# Patient Record
Sex: MALE | Race: WHITE | NOT HISPANIC OR LATINO | URBAN - METROPOLITAN AREA
[De-identification: names, ages, dates, MRNs, and addresses within clinical notes are randomized per-mention and may not be internally consistent; named-entity substitution may affect disease eponyms.]

---

## 2018-12-29 ENCOUNTER — OFFICE VISIT (OUTPATIENT)
Dept: URGENT CARE | Facility: CLINIC | Age: 28
End: 2018-12-29
Payer: COMMERCIAL

## 2018-12-29 VITALS
HEIGHT: 67 IN | DIASTOLIC BLOOD PRESSURE: 90 MMHG | WEIGHT: 206 LBS | BODY MASS INDEX: 32.33 KG/M2 | RESPIRATION RATE: 16 BRPM | HEART RATE: 60 BPM | OXYGEN SATURATION: 98 % | TEMPERATURE: 98.1 F | SYSTOLIC BLOOD PRESSURE: 140 MMHG

## 2018-12-29 DIAGNOSIS — S01.80XA AVULSION OF SKIN OF FACE, INITIAL ENCOUNTER: ICD-10-CM

## 2018-12-29 DIAGNOSIS — S01.81XA LACERATION OF FACE WITH COMPLICATION, INITIAL ENCOUNTER: Primary | ICD-10-CM

## 2018-12-29 PROCEDURE — 99203 OFFICE O/P NEW LOW 30 MIN: CPT

## 2018-12-29 PROCEDURE — 12011 RPR F/E/E/N/L/M 2.5 CM/<: CPT | Performed by: NURSE PRACTITIONER

## 2018-12-29 NOTE — PROGRESS NOTES
Assessment/Plan    Laceration of face with complication, initial encounter [S01 81XA]  1  Laceration of face with complication, initial encounter     2  Avulsion of skin of face, initial encounter           Subjective:     Patient ID: Juli Multani is a 25 y o  male  Reason For Visit / Chief Complaint  Chief Complaint   Patient presents with    Facial Injury     Pt reports pulling the back hatchdoor of his SUV onto the bridge of his nose  He reports he was wearing metal glasses which removed skin  Last tetanus 2018  This is a 25year-old male who sustained a laceration and avulsion after accidentally closing the trunk of his SUV onto his glasses  The glasses cut the skin above his nose and removed a pieced of skin  Pt presents today for treatment and repair  His last tetanus was in 2018  Facial Injury            No past medical history on file  No past surgical history on file  No family history on file  Review of Systems   Constitutional: Negative for chills and fever  Skin: Positive for wound  Negative for pallor and rash  Objective:    /90   Pulse 60   Temp 98 1 °F (36 7 °C)   Resp 16   Ht 5' 7" (1 702 m)   Wt 93 4 kg (206 lb)   SpO2 98%   BMI 32 26 kg/m²       Physical Exam   Constitutional: He is oriented to person, place, and time  Vital signs are normal  He appears well-developed and well-nourished  No distress  HENT:   Head: Normocephalic  Right Ear: Hearing and external ear normal    Left Ear: Hearing and external ear normal    Nose: Nose lacerations and sinus tenderness present  No mucosal edema, rhinorrhea, nasal deformity or septal deviation  No epistaxis  No foreign bodies  Mouth/Throat: Uvula is midline, oropharynx is clear and moist and mucous membranes are normal    Pt with 1 cm laceration to anterior bridge of nose  There is a flap of skin anteriorly    Underneath the flap there is a 1cm laterally avulsed piece of skin missing consistent with trunk hitting his glasses he was wearing  There is no defomity  He is able to breath through his nose  He does not have a nosebleed  Eyes: Conjunctivae are normal    Neck: Normal range of motion  Cardiovascular: Normal rate and regular rhythm  Pulmonary/Chest: Effort normal    Musculoskeletal: Normal range of motion  Neurological: He is alert and oriented to person, place, and time  Psychiatric: He has a normal mood and affect  His speech is normal and behavior is normal  Judgment normal  Cognition and memory are normal      Laceration repair  Date/Time: 12/29/2018 3:00 PM  Performed by: Margo Maya by: Gigi Agee   Consent: Verbal consent obtained  Risks and benefits: risks, benefits and alternatives were discussed  Consent given by: patient  Patient understanding: patient states understanding of the procedure being performed  Patient identity confirmed: verbally with patient  Body area: head/neck  Location details: nose  Laceration length: 1 cm  Foreign bodies: no foreign bodies  Tendon involvement: none  Nerve involvement: none      Procedure Details:  Irrigation solution: saline  Amount of cleaning: standard  Debridement: none  Skin closure: glue  Comments: Pt with laceration and avulsion  Area of laceration and flap closed with glue  Dried  Tolerated well  Once thoroughly dried, avulsion covered with petroleum dressing and bandaid  Pt declines plastics

## 2018-12-29 NOTE — PATIENT INSTRUCTIONS
We saw you today for a cut to your face/forehead  We glued the flap portion  There is a piece of tissue under the flap that is not able to be sutured as we discussed  Keep the glued area dry  Apply dressing to open skin area  Follow-up with plastic surgeon if desired  Your tetanus is up to date

## 2022-08-11 ENCOUNTER — HOSPITAL ENCOUNTER (EMERGENCY)
Facility: HOSPITAL | Age: 32
Discharge: HOME | End: 2022-08-11
Attending: EMERGENCY MEDICINE
Payer: COMMERCIAL

## 2022-08-11 VITALS
OXYGEN SATURATION: 99 % | RESPIRATION RATE: 16 BRPM | HEART RATE: 56 BPM | DIASTOLIC BLOOD PRESSURE: 92 MMHG | TEMPERATURE: 98.2 F | SYSTOLIC BLOOD PRESSURE: 131 MMHG

## 2022-08-11 DIAGNOSIS — H60.502 ACUTE OTITIS EXTERNA OF LEFT EAR: Primary | ICD-10-CM

## 2022-08-11 PROCEDURE — 99282 EMERGENCY DEPT VISIT SF MDM: CPT

## 2022-08-11 PROCEDURE — 99284 EMERGENCY DEPT VISIT MOD MDM: CPT

## 2022-08-11 RX ORDER — OFLOXACIN 3 MG/ML
10 SOLUTION AURICULAR (OTIC) DAILY
Qty: 5 ML | Refills: 0 | Status: SHIPPED | OUTPATIENT
Start: 2022-08-11 | End: 2022-08-17

## 2022-08-11 NOTE — ED PROVIDER NOTES
History  Chief Complaint   Patient presents with    Earache     Pt c/o of left ear pain since Tuesday, aching and pressure, nasal drip     Patient is a 27 YOM who presents to the ED with left ear pain x3 days  Patient states his pain began Tuesday and has been increasing since onset  Patient describes his pain as an "achy" feeling  He has not tried anything for the pain  He denies any hearing loss in his left ear  He believes he went swimming over the weekend but cannot recall for sure  He states he does additionally have a runny nose for the last two days  Denies right ear pain, fevers/chills, headaches, lightheadedness, SOB, chest pain, abdominal pain, NVD, urinary or stool changes  None       History reviewed  No pertinent past medical history  History reviewed  No pertinent surgical history  History reviewed  No pertinent family history  I have reviewed and agree with the history as documented  E-Cigarette/Vaping    E-Cigarette Use Never User      E-Cigarette/Vaping Substances    Nicotine No     THC No     CBD No     Flavoring No     Other No     Unknown No      Social History     Tobacco Use    Smoking status: Never Smoker    Smokeless tobacco: Never Used   Vaping Use    Vaping Use: Never used   Substance Use Topics    Alcohol use: Yes    Drug use: Never       Review of Systems   Constitutional: Negative for chills and fever  HENT: Positive for ear pain (left ear pain)  Negative for ear discharge, sore throat and tinnitus  Eyes: Negative for photophobia and visual disturbance  Respiratory: Negative for cough and shortness of breath  Cardiovascular: Negative for chest pain and palpitations  Gastrointestinal: Negative for abdominal pain, constipation, diarrhea, nausea and vomiting  Genitourinary: Negative for difficulty urinating and dysuria  Musculoskeletal: Negative for arthralgias, neck pain and neck stiffness  Skin: Negative for color change and pallor  Neurological: Negative for dizziness, syncope, light-headedness and headaches  Physical Exam  Physical Exam  Vitals and nursing note reviewed  Constitutional:       General: He is not in acute distress  Appearance: Normal appearance  He is well-developed  He is not ill-appearing or diaphoretic  HENT:      Head: Normocephalic and atraumatic  Right Ear: Tympanic membrane, ear canal and external ear normal  There is no impacted cerumen  Left Ear: Tympanic membrane normal  Tenderness present  There is no impacted cerumen  No mastoid tenderness  Tympanic membrane is not perforated, erythematous, retracted or bulging  Ears:      Comments: Left ear shows no signs of bulging or mastoiditis  No signs of blood or discharge in left ear  Patient does have tragus tenderness  TM appears normal and non-bulging  Patient does have an erythematous     Mouth/Throat:      Mouth: Mucous membranes are moist       Pharynx: Oropharynx is clear  No oropharyngeal exudate or posterior oropharyngeal erythema  Eyes:      Conjunctiva/sclera: Conjunctivae normal    Cardiovascular:      Rate and Rhythm: Normal rate and regular rhythm  Heart sounds: No murmur heard  Pulmonary:      Effort: Pulmonary effort is normal  No respiratory distress  Breath sounds: Normal breath sounds  Abdominal:      Palpations: Abdomen is soft  Tenderness: There is no abdominal tenderness  Musculoskeletal:      Cervical back: Neck supple  Skin:     General: Skin is warm and dry  Neurological:      General: No focal deficit present  Mental Status: He is alert and oriented to person, place, and time     Psychiatric:         Mood and Affect: Mood normal          Behavior: Behavior normal          Vital Signs  ED Triage Vitals [08/11/22 1342]   Temperature Pulse Respirations Blood Pressure SpO2   98 2 °F (36 8 °C) 56 16 131/92 99 %      Temp Source Heart Rate Source Patient Position - Orthostatic VS BP Location FiO2 (%)   Oral Monitor Sitting Right arm --      Pain Score       --           Vitals:    08/11/22 1342   BP: 131/92   Pulse: 56   Patient Position - Orthostatic VS: Sitting         Visual Acuity      ED Medications  Medications - No data to display    Diagnostic Studies  Results Reviewed     None                 No orders to display              Procedures  Procedures         ED Course                                             MDM  Number of Diagnoses or Management Options  Acute otitis externa of left ear: new and requires workup  Diagnosis management comments: Patient presents to the ED with left ear pain x3 days  Denies other acute complaints at this time  Afebrile, well-appearing patient, no signs of mastoiditis  Exam showed tragus tenderness and erythematous external ear canal   Patient prescribed ofloxacin ear drops sent to pharmacy  Advised to follow-up with PCP with strict ED return precautions  Patient verbalizes understanding and agreement with plan  Amount and/or Complexity of Data Reviewed  Discuss the patient with other providers: yes        Disposition  Final diagnoses:   Acute otitis externa of left ear     Time reflects when diagnosis was documented in both MDM as applicable and the Disposition within this note     Time User Action Codes Description Comment    8/11/2022  1:54 PM Lea Lady Add [H60 92] Left otitis externa     8/11/2022  1:54 PM Lea Lady Add [H60 502] Acute otitis externa of left ear     8/11/2022  1:54 PM Lea Lady Modify [X73 600] Acute otitis externa of left ear     8/11/2022  1:54 PM Lea Lady Remove [H60 92] Left otitis externa       ED Disposition     ED Disposition   Discharge    Condition   Stable    Date/Time   Thu Aug 11, 2022  2:02 PM    Comment   Anselmo Mercado discharge to home/self care                 Follow-up Information     Follow up With Specialties Details Why [address], DO Family Medicine Schedule an appointment as soon as possible for a visit  For re-check [address]  [facility] [id number]  [phone]            Discharge Medication List as of 8/11/2022  2:02 PM      START taking these medications    Details   ofloxacin (FLOXIN) 0 3 % otic solution Administer 10 drops into ears daily for 6 days, Starting Thu 8/11/2022, Until Wed 8/17/2022, Normal             No discharge procedures on file      PDMP Review     None          ED Provider  Electronically Signed by           Bear Herrera PA-C  08/11/22 9823

## 2024-06-25 ENCOUNTER — OCCMED (OUTPATIENT)
Dept: URGENT CARE | Facility: CLINIC | Age: 34
End: 2024-06-25

## 2024-06-25 ENCOUNTER — APPOINTMENT (OUTPATIENT)
Dept: LAB | Facility: CLINIC | Age: 34
End: 2024-06-25

## 2024-06-25 DIAGNOSIS — Z02.1 PRE-EMPLOYMENT EXAMINATION: ICD-10-CM

## 2024-06-25 DIAGNOSIS — Z02.1 PRE-EMPLOYMENT EXAMINATION: Primary | ICD-10-CM

## 2024-06-25 LAB
ALBUMIN SERPL BCG-MCNC: 4.4 G/DL (ref 3.5–5)
ALP SERPL-CCNC: 71 U/L (ref 34–104)
ALT SERPL W P-5'-P-CCNC: 16 U/L (ref 7–52)
ANION GAP SERPL CALCULATED.3IONS-SCNC: 5 MMOL/L (ref 4–13)
AST SERPL W P-5'-P-CCNC: 13 U/L (ref 13–39)
BASOPHILS # BLD AUTO: 0.03 THOUSANDS/ÂΜL (ref 0–0.1)
BASOPHILS NFR BLD AUTO: 1 % (ref 0–1)
BILIRUB SERPL-MCNC: 0.44 MG/DL (ref 0.2–1)
BUN SERPL-MCNC: 17 MG/DL (ref 5–25)
CALCIUM SERPL-MCNC: 9.2 MG/DL (ref 8.4–10.2)
CHLORIDE SERPL-SCNC: 104 MMOL/L (ref 96–108)
CO2 SERPL-SCNC: 29 MMOL/L (ref 21–32)
CREAT SERPL-MCNC: 1.01 MG/DL (ref 0.6–1.3)
EOSINOPHIL # BLD AUTO: 0.11 THOUSAND/ÂΜL (ref 0–0.61)
EOSINOPHIL NFR BLD AUTO: 2 % (ref 0–6)
ERYTHROCYTE [DISTWIDTH] IN BLOOD BY AUTOMATED COUNT: 13.1 % (ref 11.6–15.1)
GFR SERPL CREATININE-BSD FRML MDRD: 95 ML/MIN/1.73SQ M
GLUCOSE P FAST SERPL-MCNC: 83 MG/DL (ref 65–99)
HCT VFR BLD AUTO: 45.2 % (ref 36.5–49.3)
HGB BLD-MCNC: 15.2 G/DL (ref 12–17)
IMM GRANULOCYTES # BLD AUTO: 0.01 THOUSAND/UL (ref 0–0.2)
IMM GRANULOCYTES NFR BLD AUTO: 0 % (ref 0–2)
LYMPHOCYTES # BLD AUTO: 2.11 THOUSANDS/ÂΜL (ref 0.6–4.47)
LYMPHOCYTES NFR BLD AUTO: 41 % (ref 14–44)
MCH RBC QN AUTO: 28.6 PG (ref 26.8–34.3)
MCHC RBC AUTO-ENTMCNC: 33.6 G/DL (ref 31.4–37.4)
MCV RBC AUTO: 85 FL (ref 82–98)
MONOCYTES # BLD AUTO: 0.41 THOUSAND/ÂΜL (ref 0.17–1.22)
MONOCYTES NFR BLD AUTO: 8 % (ref 4–12)
NEUTROPHILS # BLD AUTO: 2.48 THOUSANDS/ÂΜL (ref 1.85–7.62)
NEUTS SEG NFR BLD AUTO: 48 % (ref 43–75)
NRBC BLD AUTO-RTO: 0 /100 WBCS
PLATELET # BLD AUTO: 248 THOUSANDS/UL (ref 149–390)
PMV BLD AUTO: 9.9 FL (ref 8.9–12.7)
POTASSIUM SERPL-SCNC: 3.8 MMOL/L (ref 3.5–5.3)
PROT SERPL-MCNC: 7 G/DL (ref 6.4–8.4)
RBC # BLD AUTO: 5.32 MILLION/UL (ref 3.88–5.62)
SODIUM SERPL-SCNC: 138 MMOL/L (ref 135–147)
WBC # BLD AUTO: 5.15 THOUSAND/UL (ref 4.31–10.16)

## 2024-06-25 PROCEDURE — 36415 COLL VENOUS BLD VENIPUNCTURE: CPT

## 2024-06-25 PROCEDURE — 80053 COMPREHEN METABOLIC PANEL: CPT

## 2024-06-25 PROCEDURE — 85025 COMPLETE CBC W/AUTO DIFF WBC: CPT | Performed by: PHYSICIAN ASSISTANT
